# Patient Record
Sex: MALE | Race: OTHER | NOT HISPANIC OR LATINO | URBAN - METROPOLITAN AREA
[De-identification: names, ages, dates, MRNs, and addresses within clinical notes are randomized per-mention and may not be internally consistent; named-entity substitution may affect disease eponyms.]

---

## 2023-06-13 ENCOUNTER — EMERGENCY (EMERGENCY)
Facility: HOSPITAL | Age: 23
LOS: 1 days | Discharge: HOME | End: 2023-06-13
Attending: EMERGENCY MEDICINE | Admitting: EMERGENCY MEDICINE
Payer: SELF-PAY

## 2023-06-13 VITALS
DIASTOLIC BLOOD PRESSURE: 79 MMHG | SYSTOLIC BLOOD PRESSURE: 124 MMHG | OXYGEN SATURATION: 95 % | HEART RATE: 55 BPM | RESPIRATION RATE: 17 BRPM | TEMPERATURE: 97 F

## 2023-06-13 VITALS
RESPIRATION RATE: 18 BRPM | DIASTOLIC BLOOD PRESSURE: 69 MMHG | OXYGEN SATURATION: 96 % | SYSTOLIC BLOOD PRESSURE: 108 MMHG | TEMPERATURE: 98 F | HEART RATE: 70 BPM

## 2023-06-13 PROCEDURE — 99283 EMERGENCY DEPT VISIT LOW MDM: CPT

## 2023-06-13 PROCEDURE — 99053 MED SERV 10PM-8AM 24 HR FAC: CPT

## 2023-06-13 NOTE — ED ADULT TRIAGE NOTE - CHIEF COMPLAINT QUOTE
BIBEMS from [place] and [facility] for AMS. Per ems, pt nodded yes to etoh and marijuana use. no signs of injury, pt responsive to painful stimuli BIBEMS from [place] and [facility] for AMS. Per ems, pt nodded yes to etoh and marijuana use. no signs of injury, pt responsive to painful stimuli. EMS placed PIC, gave approx. 500 ml of NS and gave 4mg zofran

## 2023-06-13 NOTE — ED PROVIDER NOTE - PATIENT PORTAL LINK FT
You can access the FollowMyHealth Patient Portal offered by [facility] by registering at the following website: [email or web address]. By joining [facility]’s FollowMyHealth portal, you will also be able to view your health information using other applications (apps) compatible with our system.

## 2023-06-13 NOTE — ED ADULT NURSE NOTE - CHIEF COMPLAINT QUOTE
BIBEMS from [place] and [facility] for AMS. Per ems, pt nodded yes to etoh and marijuana use. no signs of injury, pt responsive to painful stimuli. EMS placed PIC, gave approx. 500 ml of NS and gave 4mg zofran

## 2023-06-13 NOTE — ED PROVIDER NOTE - OBJECTIVE STATEMENT
23-year-old male unable to provide a history or cooperate with physical exam secondary to likely intoxication.  Patient arousable to voice.

## 2023-06-13 NOTE — ED ADULT NURSE NOTE - NSFALLRISKINTERV_ED_ALL_ED

## 2023-06-13 NOTE — ED PROVIDER NOTE - CLINICAL SUMMARY MEDICAL DECISION MAKING FREE TEXT BOX
Patient with stable vital signs presents with alcohol likely alcohol intoxication on the field EMS noted him to endorse alcohol and marijuana this evening.  Has stable vital signs and no signs of head trauma responsive to voice we will continue to monitor for improving sobriety

## 2023-06-15 DIAGNOSIS — R41.82 ALTERED MENTAL STATUS, UNSPECIFIED: ICD-10-CM
